# Patient Record
Sex: MALE | URBAN - METROPOLITAN AREA
[De-identification: names, ages, dates, MRNs, and addresses within clinical notes are randomized per-mention and may not be internally consistent; named-entity substitution may affect disease eponyms.]

---

## 2023-05-02 VITALS
HEIGHT: 66 IN | HEART RATE: 85 BPM | WEIGHT: 149.91 LBS | OXYGEN SATURATION: 95 % | SYSTOLIC BLOOD PRESSURE: 149 MMHG | RESPIRATION RATE: 18 BRPM | TEMPERATURE: 98 F | DIASTOLIC BLOOD PRESSURE: 87 MMHG

## 2023-05-02 RX ORDER — CHLORHEXIDINE GLUCONATE 213 G/1000ML
1 SOLUTION TOPICAL ONCE
Refills: 0 | Status: DISCONTINUED | OUTPATIENT
Start: 2023-05-08 | End: 2023-05-23

## 2023-05-02 NOTE — H&P ADULT - NSHPLABSRESULTS_GEN_ALL_CORE
14.3   6.62  )-----------( 253      ( 08 May 2023 13:24 )             42.3       05-08    135  |  100  |  13  ----------------------------<  291<H>  4.1   |  26  |  0.65    Ca    8.6      08 May 2023 13:24  Mg     1.7     05-08    TPro  7.3  /  Alb  4.2  /  TBili  0.3  /  DBili  x   /  AST  20  /  ALT  27  /  AlkPhos  57  05-08      PT/INR - ( 08 May 2023 13:24 )   PT: 11.7 sec;   INR: 0.98          PTT - ( 08 May 2023 13:24 )  PTT:33.1 sec    CARDIAC MARKERS ( 08 May 2023 13:24 )  x     / x     / 112 U/L / x     / 2.9 ng/mL

## 2023-05-02 NOTE — H&P ADULT - HISTORY OF PRESENT ILLNESS
Pharmacy: _______  Cardiologist: _______   Escort: _______    62 year old M with PMHx HTN T2DM w/ foot ulcer with extensive history of treatment, presented to Dr Darden with c/o chest pain (L>R) as well as dyspnea on exertion, able to walk only 2 flights of stairs, at times w/ symptoms at rest. Patient also endorsing b/l lower extremity edema. Patient underwent NST revealing reversible mild defect in the inferior and apical regions, with stress LVEF 55-60%. TTE showing normal LV size/systolic function, G1DD, mild MR. Patient otherwise denies ______ CP, SOB, dizziness, palpitations, orthopnea/PND, leg swelling, LOC, bleeding, melena/hematochezia, fever, chills, URI symptoms, or recent illness. In light of risk factors, CCS class ____ anginal symptoms and abnormal ____, pt now presents for cardiac catheterization with possible intervention if clinically indicated.  Pharmacy: [facility]   Cardiologist: Dr. Darden  Escort: Son    62 year old M with PMHx HTN T2DM w/ foot ulcer with extensive history of treatment, presented to Dr Darden with c/o chest pain (L>R) as well as dyspnea on exertion, able to walk only 2 flights of stairs, at times w/ symptoms at rest. Patient also endorsing b/l lower extremity edema. Patient underwent NST revealing reversible mild defect in the inferior and apical regions, with stress LVEF 55-60%. TTE showing normal LV size/systolic function, G1DD, mild MR. Patient otherwise denies CP, SOB, dizziness, palpitations, orthopnea/PND, leg swelling, LOC, bleeding, melena/hematochezia, fever, chills, URI symptoms, or recent illness. In light of risk factors, CCS class ____ anginal symptoms and abnormal ____, pt now presents for cardiac catheterization with possible intervention if clinically indicated.  Pharmacy: [facility]   Cardiologist: Dr. Darden  Escort: Son    62 year old M with PMHx HTN T2DM w/ foot ulcer with extensive history of treatment, presented to Dr Darden with c/o chest pain (L>R) as well as dyspnea on exertion, able to walk only 2 flights of stairs, at times w/ symptoms at rest. Patient also endorsing b/l lower extremity edema. Patient underwent NST revealing reversible mild defect in the inferior and apical regions, with stress LVEF 55-60%. TTE showing normal LV size/systolic function, G1DD, mild MR. Patient otherwise denies CP, SOB, dizziness, palpitations, orthopnea/PND, leg swelling, LOC, bleeding, melena/hematochezia, fever, chills, URI symptoms, or recent illness. In light of risk factors, CCS class ____ anginal symptoms and abnormal ____, pt now presents for cardiac catheterization with possible intervention if clinically indicated.  Pharmacy: [facility]   Cardiologist: Dr. Darden  Escort: Son    62 year old M with PMHx HTN T2DM w/ foot ulcer with extensive history of treatment, presented to Dr Darden with c/o chest pain (L>R) as well as dyspnea on exertion, able to walk only 2 flights of stairs, at times w/ symptoms at rest. Patient also endorsing b/l lower extremity edema. Patient underwent NST revealing reversible mild defect in the inferior and apical regions, with stress LVEF 55-60%. TTE showing normal LV size/systolic function, G1DD, mild MR. Patient otherwise denies CP, SOB, dizziness, palpitations, orthopnea/PND, leg swelling, LOC, bleeding, melena/hematochezia, fever, chills, URI symptoms, or recent illness. In light of risk factors, CCS class ____ anginal symptoms and abnormal ____, pt now presents for cardiac catheterization with possible intervention if clinically indicated.  Pharmacy: [facility]   Cardiologist: Dr. Darden  Escort: Son    Natividad verified with pharmacy    62 year old M former smoker with PMHx HTN T2DM w/ foot ulcer with extensive history of treatment, presented to Dr Darden with c/o chest pain (L>R) as well as dyspnea on exertion, able to walk only 2 flights of stairs, at times w/ symptoms at rest. Patient also endorsing b/l lower extremity edema. Patient underwent NST revealing reversible mild defect in the inferior and apical regions, with stress LVEF 55-60%. TTE showing normal LV size/systolic function, G1DD, mild MR. Patient otherwise denies CP, SOB, dizziness, palpitations, orthopnea/PND, leg swelling, LOC, bleeding, melena/hematochezia, fever, chills, URI symptoms, or recent illness. In light of risk factors, CCS class III anginal symptoms and abnormal NST, pt now presents for cardiac catheterization with possible intervention if clinically indicated.  Pharmacy: [facility]   Cardiologist: Dr. Darden  Escort: Son    Natividad verified with pharmacy    62 year old M former smoker with PMHx HTN T2DM w/ foot ulcer with extensive history of treatment, presented to Dr Darden with c/o chest pain (L>R) as well as dyspnea on exertion, able to walk only 2 flights of stairs, at times w/ symptoms at rest. Patient also endorsing b/l lower extremity edema. Patient underwent NST revealing reversible mild defect in the inferior and apical regions, with stress LVEF 55-60%. TTE showing normal LV size/systolic function, G1DD, mild MR. Patient otherwise denies CP, SOB, dizziness, palpitations, orthopnea/PND, leg swelling, LOC, bleeding, melena/hematochezia, fever, chills, URI symptoms, or recent illness. In light of risk factors, CCS class III anginal symptoms and abnormal NST, pt now presents for cardiac catheterization with possible intervention if clinically indicated.  Pharmacy: [facility]   Cardiologist: Dr. Darden  Escort: Son    Natividad verified with pharmacy    62 year old M former smoker with PMHx HTN T2DM w/ foot ulcer with extensive history of treatment, presented to Dr Darden with c/o chest pain (L>R) as well as dyspnea on exertion, able to walk only 2 flights of stairs, at times w/ symptoms at rest. Patient also endorsing b/l lower extremity edema. Patient underwent NST revealing reversible mild defect in the inferior and apical regions, with stress LVEF 55-60%. TTE showing normal LV size/systolic function, G1DD, mild MR. Patient otherwise denies CP, SOB, dizziness, palpitations, orthopnea/PND, leg swelling, LOC, bleeding, melena/hematochezia, fever, chills, URI symptoms, or recent illness. In light of risk factors, CCS class III anginal symptoms and abnormal NST, pt now presents for cardiac catheterization with possible intervention if clinically indicated.

## 2023-05-02 NOTE — H&P ADULT - ASSESSMENT
62 year old M former smoker with PMHx HTN T2DM w/ foot ulcer with extensive history of treatment, presented to Dr Darden with c/o chest pain (L>R) as well as dyspnea on exertion, able to walk only 2 flights of stairs, at times w/ symptoms at rest. Patient also endorsing b/l lower extremity edema. Patient underwent NST revealing reversible mild defect in the inferior and apical regions, with stress LVEF 55-60%. TTE showing normal LV size/systolic function, G1DD, mild MR. In light of risk factors, CCS class III anginal symptoms and abnormal NST, pt now presents for cardiac catheterization with possible intervention if clinically indicated.     EKG: NSR 81bpm no TWI			  ASA III	Mallampati class: III   Anginal Class: III    -morphine (Hives)  penicillins (Hives)    -H/H = 14.3/42.3  . Pt denies BRBPR, hematuria, hematochezia, melena. Pt endorses compliance w/ home aspirin, stating last dose was 5/7/23. Pt loaded w/ ASA 81mg x1.   Meds verified with pharmacy. Patient stating he does not take plavix, although per pharmacy he is prescribed it. Loaded with plavix 600mg x1  -BUN/Cr = 13/0.65  . EF 55-60%. Euvolemic on exam. IV NS @ 250cc bolus followed by 75 cc/hr x 2hrs started pre procedure    Sedation Plan:   Moderate  Patient Is Suitable Candidate For Sedation?     Yes    Risks & benefits of procedure and alternative therapy have been explained to the patient including but not limited to: allergic reaction, bleeding with possible need for blood transfusion, infection, renal and vascular compromise, limb damage, arrhythmia, stroke, vessel dissection/perforation, myocardial infarction, and emergent CABG. Informed consent obtained at bedside and included in chart.

## 2023-05-08 ENCOUNTER — OUTPATIENT (OUTPATIENT)
Dept: OUTPATIENT SERVICES | Facility: HOSPITAL | Age: 63
LOS: 1 days | Discharge: HOME | End: 2023-05-08
Payer: COMMERCIAL

## 2023-05-08 LAB
A1C WITH ESTIMATED AVERAGE GLUCOSE RESULT: 10 % — HIGH (ref 4–5.6)
ALBUMIN SERPL ELPH-MCNC: 4.2 G/DL — SIGNIFICANT CHANGE UP (ref 3.3–5)
ALP SERPL-CCNC: 57 U/L — SIGNIFICANT CHANGE UP (ref 40–120)
ALT FLD-CCNC: 27 U/L — SIGNIFICANT CHANGE UP (ref 10–45)
ANION GAP SERPL CALC-SCNC: 9 MMOL/L — SIGNIFICANT CHANGE UP (ref 5–17)
APTT BLD: 33.1 SEC — SIGNIFICANT CHANGE UP (ref 27.5–35.5)
AST SERPL-CCNC: 20 U/L — SIGNIFICANT CHANGE UP (ref 10–40)
BASOPHILS # BLD AUTO: 0.05 K/UL — SIGNIFICANT CHANGE UP (ref 0–0.2)
BASOPHILS NFR BLD AUTO: 0.8 % — SIGNIFICANT CHANGE UP (ref 0–2)
BILIRUB SERPL-MCNC: 0.3 MG/DL — SIGNIFICANT CHANGE UP (ref 0.2–1.2)
BUN SERPL-MCNC: 13 MG/DL — SIGNIFICANT CHANGE UP (ref 7–23)
CALCIUM SERPL-MCNC: 8.6 MG/DL — SIGNIFICANT CHANGE UP (ref 8.4–10.5)
CHLORIDE SERPL-SCNC: 100 MMOL/L — SIGNIFICANT CHANGE UP (ref 96–108)
CHOLEST SERPL-MCNC: 151 MG/DL — SIGNIFICANT CHANGE UP
CK MB CFR SERPL CALC: 2.9 NG/ML — SIGNIFICANT CHANGE UP (ref 0–6.7)
CK SERPL-CCNC: 112 U/L — SIGNIFICANT CHANGE UP (ref 30–200)
CO2 SERPL-SCNC: 26 MMOL/L — SIGNIFICANT CHANGE UP (ref 22–31)
COHGB MFR BLDV: 1.1 % — SIGNIFICANT CHANGE UP
CREAT SERPL-MCNC: 0.65 MG/DL — SIGNIFICANT CHANGE UP (ref 0.5–1.3)
EGFR: 105 ML/MIN/1.73M2 — SIGNIFICANT CHANGE UP
EOSINOPHIL # BLD AUTO: 0.88 K/UL — HIGH (ref 0–0.5)
EOSINOPHIL NFR BLD AUTO: 13.3 % — HIGH (ref 0–6)
ESTIMATED AVERAGE GLUCOSE: 240 MG/DL — HIGH (ref 68–114)
GLUCOSE BLDC GLUCOMTR-MCNC: 192 MG/DL — HIGH (ref 70–99)
GLUCOSE BLDC GLUCOMTR-MCNC: 259 MG/DL — HIGH (ref 70–99)
GLUCOSE SERPL-MCNC: 291 MG/DL — HIGH (ref 70–99)
HCT VFR BLD CALC: 42.3 % — SIGNIFICANT CHANGE UP (ref 39–50)
HCT VFR BLDA CALC: 44 % — SIGNIFICANT CHANGE UP
HDLC SERPL-MCNC: 52 MG/DL — SIGNIFICANT CHANGE UP
HGB BLD CALC-MCNC: 14.6 G/DL — SIGNIFICANT CHANGE UP (ref 12.6–17.4)
HGB BLD-MCNC: 14.3 G/DL — SIGNIFICANT CHANGE UP (ref 13–17)
IMM GRANULOCYTES NFR BLD AUTO: 0.2 % — SIGNIFICANT CHANGE UP (ref 0–0.9)
INR BLD: 0.98 — SIGNIFICANT CHANGE UP (ref 0.88–1.16)
ISTAT INR: 1.1 — SIGNIFICANT CHANGE UP (ref 0.88–1.16)
ISTAT PT: 13 SEC — HIGH (ref 10–12.9)
LIPID PNL WITH DIRECT LDL SERPL: 85 MG/DL — SIGNIFICANT CHANGE UP
LYMPHOCYTES # BLD AUTO: 1.83 K/UL — SIGNIFICANT CHANGE UP (ref 1–3.3)
LYMPHOCYTES # BLD AUTO: 27.6 % — SIGNIFICANT CHANGE UP (ref 13–44)
MAGNESIUM SERPL-MCNC: 1.7 MG/DL — SIGNIFICANT CHANGE UP (ref 1.6–2.6)
MCHC RBC-ENTMCNC: 29.1 PG — SIGNIFICANT CHANGE UP (ref 27–34)
MCHC RBC-ENTMCNC: 33.8 GM/DL — SIGNIFICANT CHANGE UP (ref 32–36)
MCV RBC AUTO: 86 FL — SIGNIFICANT CHANGE UP (ref 80–100)
METHGB MFR BLDV: 0.6 % — SIGNIFICANT CHANGE UP
MONOCYTES # BLD AUTO: 0.89 K/UL — SIGNIFICANT CHANGE UP (ref 0–0.9)
MONOCYTES NFR BLD AUTO: 13.4 % — SIGNIFICANT CHANGE UP (ref 2–14)
NEUTROPHILS # BLD AUTO: 2.96 K/UL — SIGNIFICANT CHANGE UP (ref 1.8–7.4)
NEUTROPHILS NFR BLD AUTO: 44.7 % — SIGNIFICANT CHANGE UP (ref 43–77)
NON HDL CHOLESTEROL: 99 MG/DL — SIGNIFICANT CHANGE UP
NRBC # BLD: 0 /100 WBCS — SIGNIFICANT CHANGE UP (ref 0–0)
PLATELET # BLD AUTO: 253 K/UL — SIGNIFICANT CHANGE UP (ref 150–400)
POCT ISTAT CREATININE: 0.7 MG/DL — SIGNIFICANT CHANGE UP (ref 0.5–1.3)
POTASSIUM SERPL-MCNC: 4.1 MMOL/L — SIGNIFICANT CHANGE UP (ref 3.5–5.3)
POTASSIUM SERPL-SCNC: 4.1 MMOL/L — SIGNIFICANT CHANGE UP (ref 3.5–5.3)
PROT SERPL-MCNC: 7.3 G/DL — SIGNIFICANT CHANGE UP (ref 6–8.3)
PROTHROM AB SERPL-ACNC: 11.7 SEC — SIGNIFICANT CHANGE UP (ref 10.5–13.4)
RBC # BLD: 4.92 M/UL — SIGNIFICANT CHANGE UP (ref 4.2–5.8)
RBC # FLD: 14.2 % — SIGNIFICANT CHANGE UP (ref 10.3–14.5)
SAO2 % BLDV: 77 % — SIGNIFICANT CHANGE UP (ref 67–88)
SODIUM SERPL-SCNC: 135 MMOL/L — SIGNIFICANT CHANGE UP (ref 135–145)
TRIGL SERPL-MCNC: 72 MG/DL — SIGNIFICANT CHANGE UP
WBC # BLD: 6.62 K/UL — SIGNIFICANT CHANGE UP (ref 3.8–10.5)
WBC # FLD AUTO: 6.62 K/UL — SIGNIFICANT CHANGE UP (ref 3.8–10.5)

## 2023-05-08 PROCEDURE — 83036 HEMOGLOBIN GLYCOSYLATED A1C: CPT

## 2023-05-08 PROCEDURE — 82565 ASSAY OF CREATININE: CPT

## 2023-05-08 PROCEDURE — 93458 L HRT ARTERY/VENTRICLE ANGIO: CPT

## 2023-05-08 PROCEDURE — 85730 THROMBOPLASTIN TIME PARTIAL: CPT

## 2023-05-08 PROCEDURE — 83735 ASSAY OF MAGNESIUM: CPT

## 2023-05-08 PROCEDURE — C1769: CPT

## 2023-05-08 PROCEDURE — 36415 COLL VENOUS BLD VENIPUNCTURE: CPT

## 2023-05-08 PROCEDURE — 82550 ASSAY OF CK (CPK): CPT

## 2023-05-08 PROCEDURE — 82962 GLUCOSE BLOOD TEST: CPT

## 2023-05-08 PROCEDURE — C1894: CPT

## 2023-05-08 PROCEDURE — 82803 BLOOD GASES ANY COMBINATION: CPT

## 2023-05-08 PROCEDURE — 99152 MOD SED SAME PHYS/QHP 5/>YRS: CPT

## 2023-05-08 PROCEDURE — 85610 PROTHROMBIN TIME: CPT

## 2023-05-08 PROCEDURE — 80053 COMPREHEN METABOLIC PANEL: CPT

## 2023-05-08 PROCEDURE — 93010 ELECTROCARDIOGRAM REPORT: CPT

## 2023-05-08 PROCEDURE — 85025 COMPLETE CBC W/AUTO DIFF WBC: CPT

## 2023-05-08 PROCEDURE — 93005 ELECTROCARDIOGRAM TRACING: CPT

## 2023-05-08 PROCEDURE — C1887: CPT

## 2023-05-08 PROCEDURE — 80061 LIPID PANEL: CPT

## 2023-05-08 PROCEDURE — 82553 CREATINE MB FRACTION: CPT

## 2023-05-08 RX ORDER — ASPIRIN/CALCIUM CARB/MAGNESIUM 324 MG
81 TABLET ORAL ONCE
Refills: 0 | Status: COMPLETED | OUTPATIENT
Start: 2023-05-08 | End: 2023-05-08

## 2023-05-08 RX ORDER — SODIUM CHLORIDE 9 MG/ML
1000 INJECTION, SOLUTION INTRAVENOUS
Refills: 0 | Status: DISCONTINUED | OUTPATIENT
Start: 2023-05-08 | End: 2023-05-23

## 2023-05-08 RX ORDER — DEXTROSE 50 % IN WATER 50 %
25 SYRINGE (ML) INTRAVENOUS ONCE
Refills: 0 | Status: DISCONTINUED | OUTPATIENT
Start: 2023-05-08 | End: 2023-05-23

## 2023-05-08 RX ORDER — MAGNESIUM OXIDE 400 MG ORAL TABLET 241.3 MG
800 TABLET ORAL ONCE
Refills: 0 | Status: COMPLETED | OUTPATIENT
Start: 2023-05-08 | End: 2023-05-08

## 2023-05-08 RX ORDER — DEXTROSE 50 % IN WATER 50 %
12.5 SYRINGE (ML) INTRAVENOUS ONCE
Refills: 0 | Status: DISCONTINUED | OUTPATIENT
Start: 2023-05-08 | End: 2023-05-23

## 2023-05-08 RX ORDER — SODIUM CHLORIDE 9 MG/ML
500 INJECTION INTRAMUSCULAR; INTRAVENOUS; SUBCUTANEOUS
Refills: 0 | Status: DISCONTINUED | OUTPATIENT
Start: 2023-05-08 | End: 2023-05-23

## 2023-05-08 RX ORDER — CLOPIDOGREL BISULFATE 75 MG/1
600 TABLET, FILM COATED ORAL ONCE
Refills: 0 | Status: COMPLETED | OUTPATIENT
Start: 2023-05-08 | End: 2023-05-08

## 2023-05-08 RX ORDER — SODIUM CHLORIDE 9 MG/ML
250 INJECTION INTRAMUSCULAR; INTRAVENOUS; SUBCUTANEOUS ONCE
Refills: 0 | Status: COMPLETED | OUTPATIENT
Start: 2023-05-08 | End: 2023-05-08

## 2023-05-08 RX ORDER — INSULIN LISPRO 100/ML
VIAL (ML) SUBCUTANEOUS ONCE
Refills: 0 | Status: DISCONTINUED | OUTPATIENT
Start: 2023-05-08 | End: 2023-05-23

## 2023-05-08 RX ORDER — GLUCAGON INJECTION, SOLUTION 0.5 MG/.1ML
1 INJECTION, SOLUTION SUBCUTANEOUS ONCE
Refills: 0 | Status: DISCONTINUED | OUTPATIENT
Start: 2023-05-08 | End: 2023-05-23

## 2023-05-08 RX ORDER — DEXTROSE 50 % IN WATER 50 %
15 SYRINGE (ML) INTRAVENOUS ONCE
Refills: 0 | Status: DISCONTINUED | OUTPATIENT
Start: 2023-05-08 | End: 2023-05-23

## 2023-05-08 RX ADMIN — Medication 81 MILLIGRAM(S): at 14:46

## 2023-05-08 RX ADMIN — SODIUM CHLORIDE 1000 MILLILITER(S): 9 INJECTION INTRAMUSCULAR; INTRAVENOUS; SUBCUTANEOUS at 14:47

## 2023-05-08 RX ADMIN — SODIUM CHLORIDE 75 MILLILITER(S): 9 INJECTION INTRAMUSCULAR; INTRAVENOUS; SUBCUTANEOUS at 14:47

## 2023-05-08 RX ADMIN — MAGNESIUM OXIDE 400 MG ORAL TABLET 800 MILLIGRAM(S): 241.3 TABLET ORAL at 14:47

## 2023-05-08 RX ADMIN — CLOPIDOGREL BISULFATE 600 MILLIGRAM(S): 75 TABLET, FILM COATED ORAL at 14:46

## 2023-05-08 NOTE — PROGRESS NOTE ADULT - SUBJECTIVE AND OBJECTIVE BOX
Interventional Cardiology PA SDA Discharge Note    Patient without complaints. Ambulated and voided without difficulties  Afebrile, VSS  Ext:  Right Radial :  no  hematoma,  no   bleeding, dressing; C/D/I  Pulses:    intact RAD to baseline     A/P:    s/p diagnostic cardiac catheterization (5/8/23): diffuse non-obstructive disease; normal EDP.   ACCESS: R radial artery    1.	Stable for discharge as per attending Dr. Darden after bed rest, pt voids, groin/wrist stable and 30 minutes of ambulation.  2.	Follow-up with Cardiologist Dr. Darden in 1-2 weeks  3.	Discharged forms signed and copies in chart

## 2023-05-11 DIAGNOSIS — R94.39 ABNORMAL RESULT OF OTHER CARDIOVASCULAR FUNCTION STUDY: ICD-10-CM

## 2023-05-11 DIAGNOSIS — I25.110 ATHEROSCLEROTIC HEART DISEASE OF NATIVE CORONARY ARTERY WITH UNSTABLE ANGINA PECTORIS: ICD-10-CM

## 2023-12-17 RX ORDER — CLOPIDOGREL BISULFATE 75 MG/1
1 TABLET, FILM COATED ORAL
Refills: 0 | DISCHARGE

## 2023-12-17 RX ORDER — METFORMIN HYDROCHLORIDE 850 MG/1
1 TABLET ORAL
Refills: 0 | DISCHARGE

## 2023-12-17 RX ORDER — INSULIN ASPART 100 [IU]/ML
20 INJECTION, SOLUTION SUBCUTANEOUS
Refills: 0 | DISCHARGE

## 2023-12-17 RX ORDER — ASPIRIN/CALCIUM CARB/MAGNESIUM 324 MG
1 TABLET ORAL
Refills: 0 | DISCHARGE

## 2023-12-17 RX ORDER — CARVEDILOL PHOSPHATE 80 MG/1
1 CAPSULE, EXTENDED RELEASE ORAL
Refills: 0 | DISCHARGE

## 2023-12-17 RX ORDER — ATORVASTATIN CALCIUM 80 MG/1
1 TABLET, FILM COATED ORAL
Refills: 0 | DISCHARGE

## 2023-12-17 RX ORDER — ALOGLIPTIN 12.5 MG/1
1 TABLET, FILM COATED ORAL
Refills: 0 | DISCHARGE

## 2023-12-17 RX ORDER — ERTUGLIFLOZIN 5 MG/1
1 TABLET, FILM COATED ORAL
Refills: 0 | DISCHARGE

## 2023-12-17 RX ORDER — LISINOPRIL 2.5 MG/1
1 TABLET ORAL
Refills: 0 | DISCHARGE

## 2023-12-17 RX ORDER — AMLODIPINE BESYLATE 2.5 MG/1
1 TABLET ORAL
Refills: 0 | DISCHARGE

## 2023-12-17 RX ORDER — HYDROCHLOROTHIAZIDE 25 MG
1 TABLET ORAL
Refills: 0 | DISCHARGE